# Patient Record
Sex: FEMALE
[De-identification: names, ages, dates, MRNs, and addresses within clinical notes are randomized per-mention and may not be internally consistent; named-entity substitution may affect disease eponyms.]

---

## 2022-07-14 ENCOUNTER — APPOINTMENT (OUTPATIENT)
Dept: ORTHOPEDIC SURGERY | Facility: CLINIC | Age: 67
End: 2022-07-14

## 2022-07-14 PROBLEM — Z00.00 ENCOUNTER FOR PREVENTIVE HEALTH EXAMINATION: Status: ACTIVE | Noted: 2022-07-14

## 2022-07-14 PROCEDURE — 20550 NJX 1 TENDON SHEATH/LIGAMENT: CPT

## 2022-07-14 PROCEDURE — 99202 OFFICE O/P NEW SF 15 MIN: CPT | Mod: 25

## 2022-07-14 NOTE — ASSESSMENT
[FreeTextEntry1] :   Patient is right-sided trigger thumb.  Patient received a cortisone injection into the right thumb today.  She tolerated well.  She will see me back in 1 month for potential 2nd injection.

## 2022-07-14 NOTE — PROCEDURE
[FreeTextEntry3] : Trigger finger injection was performed because of pain inflammation and stiffness\par Anesthesia: ethyl chloride sprayed topically\par Dexamethasone injection of 1cc 20mg/ml\par Lidocaine: An injection of Lidocaine 1% 1cc\par \par \par Patient has tried OTC's including aspirin, Ibuprofen, Aleve etc or prescription NSAIDS, and/or exercises at home and/ or\par physical therapy without satisfactory response.\par After verbal consent using sterile preparation and technique. The risks, benefits, and alternatives to cortisone injection\par were explained in full to the patient. Risks outlined include but are not limited to infection, sepsis, bleeding, scarring, skin\par discoloration, temporary increase in pain, syncopal episode, failure to resolve symptoms, allergic reaction, symptom\par recurrence, and elevation of blood sugar in diabetics. Patient understood the risks. All questions were answered. After\par discussion of options, patient requested an injection. Oral informed consent was obtained and sterile prep was done of the\par injection site. Sterile technique was utilized for the procedure including the preparation of the solutions used for the\par injection. Patient tolerated the procedure well. Advised to ice the injection site this evening.\par Prep with etoh locally to site. Sterile technique used\par \par right thumb\par

## 2022-07-14 NOTE — HISTORY OF PRESENT ILLNESS
[de-identified] : 67-year-old female comes in the office for evaluation regarding her right thumb.  Over the past several weeks he has developed the pain discomfort the right thumb and locking of the right thumb.  And she comes in today for evaluation.  She has had no treatment for the condition.

## 2022-08-26 ENCOUNTER — APPOINTMENT (OUTPATIENT)
Dept: ORTHOPEDIC SURGERY | Facility: CLINIC | Age: 67
End: 2022-08-26

## 2022-08-26 VITALS — WEIGHT: 133 LBS | BODY MASS INDEX: 24.48 KG/M2 | HEIGHT: 62 IN

## 2022-08-26 DIAGNOSIS — M65.311 TRIGGER THUMB, RIGHT THUMB: ICD-10-CM

## 2022-08-26 PROCEDURE — 99213 OFFICE O/P EST LOW 20 MIN: CPT | Mod: 25

## 2022-08-26 PROCEDURE — 99072 ADDL SUPL MATRL&STAF TM PHE: CPT

## 2022-08-26 PROCEDURE — 20550 NJX 1 TENDON SHEATH/LIGAMENT: CPT | Mod: RT

## 2022-08-26 NOTE — PHYSICAL EXAM
[de-identified] :  Patient has tenderness to palpation at the A1 pulley right thumb.  There is triggering present.  There is normal sensation normal capillary refill.  Id there is no swelling erythema ecchymoses or abrasions

## 2022-08-26 NOTE — ASSESSMENT
[FreeTextEntry1] :  Patient is right-sided trigger thumb.  Patient received her 2nd injection today.  If the injection does not help she will consider surgical intervention for trigger thumb release.  Risks and benefits which were discussed with the patient id postoperative course is discussed and doing it under local anesthesia was discussed.

## 2022-08-26 NOTE — HISTORY OF PRESENT ILLNESS
[de-identified] : 67-year-old female with a right-sided trigger thumb.  It patient had an injection at her last visit.  It did not significantly relieve her symptoms.  And she comes in for evaluation today.  She still getting locking of the thumb.  When it locks down there is significant pain

## 2022-08-26 NOTE — PROCEDURE
[FreeTextEntry3] : Trigger finger injection was performed because of pain inflammation and stiffness\par Anesthesia: ethyl chloride sprayed topically\par Dexamethasone injection of 1cc\par Lidocaine: An injection of Lidocaine 1% 1cc\par \par Patient has tried OTC's including aspirin, Ibuprofen, Aleve etc or prescription NSAIDS, and/or exercises at home and/ or\par physical therapy without satisfactory response.\par After verbal consent using sterile preparation and technique. The risks, benefits, and alternatives to cortisone injection\par were explained in full to the patient. Risks outlined include but are not limited to infection, sepsis, bleeding, scarring, skin\par discoloration, temporary increase in pain, syncopal episode, failure to resolve symptoms, allergic reaction, symptom\par recurrence, and elevation of blood sugar in diabetics. Patient understood the risks. All questions were answered. After\par discussion of options, patient requested an injection. Oral informed consent was obtained and sterile prep was done of the\par injection site. Sterile technique was utilized for the procedure including the preparation of the solutions used for the\par injection. Patient tolerated the procedure well. Advised to ice the injection site this evening.\par Prep with ETOH  locally to site. Sterile technique used\par  right thumb